# Patient Record
Sex: FEMALE | Race: WHITE | NOT HISPANIC OR LATINO | Employment: FULL TIME | URBAN - METROPOLITAN AREA
[De-identification: names, ages, dates, MRNs, and addresses within clinical notes are randomized per-mention and may not be internally consistent; named-entity substitution may affect disease eponyms.]

---

## 2023-12-06 ENCOUNTER — OFFICE VISIT (OUTPATIENT)
Dept: RHEUMATOLOGY | Facility: CLINIC | Age: 53
End: 2023-12-06
Payer: COMMERCIAL

## 2023-12-06 VITALS
SYSTOLIC BLOOD PRESSURE: 124 MMHG | WEIGHT: 253 LBS | BODY MASS INDEX: 40.84 KG/M2 | DIASTOLIC BLOOD PRESSURE: 78 MMHG | HEART RATE: 81 BPM

## 2023-12-06 DIAGNOSIS — M79.7 FIBROMYALGIA: Primary | ICD-10-CM

## 2023-12-06 PROCEDURE — 1036F TOBACCO NON-USER: CPT | Performed by: INTERNAL MEDICINE

## 2023-12-06 PROCEDURE — 99212 OFFICE O/P EST SF 10 MIN: CPT | Performed by: INTERNAL MEDICINE

## 2023-12-06 RX ORDER — PANTOPRAZOLE SODIUM 40 MG/1
40 TABLET, DELAYED RELEASE ORAL
COMMUNITY
Start: 2010-02-03

## 2023-12-06 RX ORDER — HALOBETASOL PROPIONATE 0.5 MG/G
CREAM TOPICAL 2 TIMES DAILY
COMMUNITY

## 2023-12-06 RX ORDER — DULOXETIN HYDROCHLORIDE 60 MG/1
60 CAPSULE, DELAYED RELEASE ORAL DAILY
COMMUNITY
Start: 2012-09-07

## 2023-12-06 RX ORDER — ATORVASTATIN CALCIUM 40 MG/1
40 TABLET, FILM COATED ORAL
COMMUNITY

## 2023-12-06 RX ORDER — LEVOTHYROXINE SODIUM 125 UG/1
125 TABLET ORAL DAILY
COMMUNITY
Start: 2022-12-26

## 2023-12-06 RX ORDER — TOPIRAMATE 50 MG/1
TABLET, FILM COATED ORAL
COMMUNITY
Start: 2020-10-07 | End: 2024-05-15 | Stop reason: SDUPTHER

## 2023-12-06 RX ORDER — RISPERIDONE 1 MG/1
1 TABLET ORAL DAILY
COMMUNITY
Start: 2020-03-24

## 2023-12-06 RX ORDER — LOSARTAN POTASSIUM 50 MG/1
50 TABLET ORAL DAILY
COMMUNITY

## 2023-12-06 RX ORDER — LIDOCAINE 50 MG/G
OINTMENT TOPICAL
COMMUNITY

## 2023-12-06 RX ORDER — SEMAGLUTIDE 1.34 MG/ML
1 INJECTION, SOLUTION SUBCUTANEOUS ONCE
COMMUNITY
Start: 2022-12-26

## 2023-12-06 RX ORDER — RISPERIDONE 0.25 MG/1
0.5 TABLET ORAL
COMMUNITY

## 2023-12-06 RX ORDER — ASPIRIN 325 MG
325 TABLET ORAL DAILY
COMMUNITY
Start: 2015-02-25

## 2023-12-06 RX ORDER — CYCLOBENZAPRINE HCL 10 MG
10 TABLET ORAL 2 TIMES DAILY PRN
COMMUNITY
End: 2024-05-15 | Stop reason: SDUPTHER

## 2023-12-06 RX ORDER — HUMAN INSULIN 100 [IU]/ML
INJECTION, SUSPENSION SUBCUTANEOUS EVERY 12 HOURS
COMMUNITY

## 2023-12-06 RX ORDER — METOPROLOL TARTRATE 25 MG/1
25 TABLET, FILM COATED ORAL DAILY
COMMUNITY
Start: 2012-10-17

## 2023-12-06 NOTE — PROGRESS NOTES
She complains of pain and numbness in the lower legs and feet.  Numbness in the toes improves with taking alpha lipoic acid.  She was unable to tolerate gabapentin in the past due to excessive sedation.  She continues to take topiramate 25 mg at bedtime.  She is planning to join an exercise program.  She is seeing a chiropractor for sciatica symptoms.    The lungs, heart, abdomen, and extremities are benign.  Pedal pulses are 1+ in the posterior tibial arteries and 2+ in the dorsalis pedis arteries bilaterally.  There are no joint effusions.  There is mild tenderness in the proximal radial aspect of the right forearm.    She has history of undifferentiated connective tissue disorder, insulin-dependent diabetes mellitus, fibromyalgia, nephrolithiasis, peripheral neuropathy in the lower extremities.    She is to continue cyclobenzaprine 10 mg at bedtime as needed for muscle spasms and topiramate 50 mg tablet half tablet every evening for fibromyalgia and peripheral neuropathy.  She is to have laboratory for CRP prior to next office visit.  She is to return at the next available office appointment.

## 2024-05-15 ENCOUNTER — OFFICE VISIT (OUTPATIENT)
Dept: RHEUMATOLOGY | Facility: CLINIC | Age: 54
End: 2024-05-15
Payer: COMMERCIAL

## 2024-05-15 VITALS
HEART RATE: 71 BPM | WEIGHT: 256 LBS | DIASTOLIC BLOOD PRESSURE: 74 MMHG | BODY MASS INDEX: 42.6 KG/M2 | SYSTOLIC BLOOD PRESSURE: 141 MMHG

## 2024-05-15 DIAGNOSIS — M47.816 OSTEOARTHRITIS OF LUMBAR SPINE, UNSPECIFIED SPINAL OSTEOARTHRITIS COMPLICATION STATUS: ICD-10-CM

## 2024-05-15 DIAGNOSIS — M79.7 FIBROMYALGIA: Primary | ICD-10-CM

## 2024-05-15 PROCEDURE — 1036F TOBACCO NON-USER: CPT | Performed by: INTERNAL MEDICINE

## 2024-05-15 PROCEDURE — 99213 OFFICE O/P EST LOW 20 MIN: CPT | Performed by: INTERNAL MEDICINE

## 2024-05-15 RX ORDER — TOPIRAMATE 50 MG/1
25 TABLET, FILM COATED ORAL DAILY
Qty: 30 TABLET | Refills: 6 | Status: SHIPPED | OUTPATIENT
Start: 2024-05-15 | End: 2025-05-15

## 2024-05-15 RX ORDER — CYCLOBENZAPRINE HCL 10 MG
10 TABLET ORAL NIGHTLY
Qty: 30 TABLET | Refills: 8 | Status: SHIPPED | OUTPATIENT
Start: 2024-05-15 | End: 2025-05-15

## 2024-05-15 NOTE — PROGRESS NOTES
She complains of generalized fatigue.  She also notes lower back pain and fatigue and numbness in her thighs.  She notes that she has difficulty walking any prolonged distance due to fatigue and heaviness in the thighs.  She has difficulty standing up from the floor.  She has had physical therapy and has been attending chiropractic therapy for the past 2 years with continued symptoms.  She notes urinary urgency but denies any bowel or bladder incontinence.  Her blood sugars have been under improved control.  She has problem with claustrophobia.    There is tenderness overlying the lumbar spine and sacroiliac joints bilaterally without induration.  There is good muscle strength in the hip flexors, knee extensors and knee flexors, the dorsiflexors and plantar flexors of both feet.    She has symptoms of neurogenic claudication with bilateral thigh pain and weakness and fatigue.  She has history of diabetes mellitus, fibromyalgia, sicca syndrome, nephrolithiasis, and peripheral neuropathy.    She is referred for x-ray of the lumbar spine as well as open MRI of the lumbar spine for evaluation for lumbar spinal canal stenosis.  She is to return at the next available office appointment.

## 2024-06-19 ENCOUNTER — APPOINTMENT (OUTPATIENT)
Dept: NEUROSURGERY | Facility: CLINIC | Age: 54
End: 2024-06-19
Payer: COMMERCIAL

## 2024-06-19 VITALS
DIASTOLIC BLOOD PRESSURE: 60 MMHG | SYSTOLIC BLOOD PRESSURE: 110 MMHG | HEIGHT: 66 IN | BODY MASS INDEX: 40.63 KG/M2 | HEART RATE: 70 BPM | WEIGHT: 252.8 LBS

## 2024-06-19 DIAGNOSIS — G89.29 CHRONIC BILATERAL LOW BACK PAIN WITHOUT SCIATICA: Primary | ICD-10-CM

## 2024-06-19 DIAGNOSIS — M54.50 CHRONIC BILATERAL LOW BACK PAIN WITHOUT SCIATICA: Primary | ICD-10-CM

## 2024-06-19 PROCEDURE — 99203 OFFICE O/P NEW LOW 30 MIN: CPT | Performed by: PHYSICIAN ASSISTANT

## 2024-06-19 PROCEDURE — 1036F TOBACCO NON-USER: CPT | Performed by: PHYSICIAN ASSISTANT

## 2024-06-19 RX ORDER — INSULIN ASPART 100 [IU]/ML
INJECTION, SOLUTION INTRAVENOUS; SUBCUTANEOUS
COMMUNITY

## 2024-06-19 RX ORDER — INSULIN GLARGINE 100 [IU]/ML
38 INJECTION, SOLUTION SUBCUTANEOUS
COMMUNITY

## 2024-06-19 RX ORDER — ACYCLOVIR 400 MG/1
400 TABLET ORAL 3 TIMES DAILY
COMMUNITY
Start: 2022-04-26

## 2024-06-19 RX ORDER — METFORMIN HYDROCHLORIDE 1000 MG/1
TABLET ORAL EVERY 12 HOURS
COMMUNITY
Start: 2022-12-26

## 2024-06-19 RX ORDER — BIOTIN 1 MG
TABLET ORAL EVERY 24 HOURS
COMMUNITY
Start: 2012-09-07

## 2024-06-19 RX ORDER — INSULIN GLARGINE 100 [IU]/ML
INJECTION, SOLUTION SUBCUTANEOUS
COMMUNITY

## 2024-06-19 RX ORDER — INSULIN GLARGINE 300 [IU]/ML
INJECTION, SOLUTION SUBCUTANEOUS
COMMUNITY
Start: 2021-02-16

## 2024-06-19 RX ORDER — CEPHRADINE 500 MG
CAPSULE ORAL EVERY 24 HOURS
COMMUNITY
Start: 2012-09-07

## 2024-06-19 RX ORDER — FLUCONAZOLE 150 MG/1
TABLET ORAL
COMMUNITY
Start: 2013-02-26

## 2024-06-19 RX ORDER — ALBUTEROL SULFATE 90 UG/1
AEROSOL, METERED RESPIRATORY (INHALATION) EVERY 6 HOURS
COMMUNITY

## 2024-06-19 ASSESSMENT — ENCOUNTER SYMPTOMS
DEPRESSION: 0
LOSS OF SENSATION IN FEET: 1
OCCASIONAL FEELINGS OF UNSTEADINESS: 0

## 2024-06-19 ASSESSMENT — PATIENT HEALTH QUESTIONNAIRE - PHQ9
1. LITTLE INTEREST OR PLEASURE IN DOING THINGS: NOT AT ALL
SUM OF ALL RESPONSES TO PHQ9 QUESTIONS 1 & 2: 0
2. FEELING DOWN, DEPRESSED OR HOPELESS: NOT AT ALL

## 2024-06-19 ASSESSMENT — PAIN SCALES - GENERAL: PAINLEVEL: 2

## 2024-06-19 NOTE — PROGRESS NOTES
[facility]  Department of Neurological Surgery  New Patient Visit    History of Present Illness:  Jorge Alberto Nguyen is a 54 y.o. year old female who presents to the spine clinic with chronic low back pain with radiating symptoms into her hips and buttocks as well as anteriorly through her thighs.  All symptoms seem to stop at her knees with nothing progressing further into her ankle or feet.  Ongoing for more than 10 years though worsening recently.  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  It is improved with sleeping though worsened also significantly with walking.  Currently rates her pain at 2/10 though will increase to 5/10.  Traction while a chiropractor did provide some limited benefit while engaged though upon standing up and walking away the pain returned.  She has an MRI identified L5-S1 foraminal stenosis left-sided as well as chronic T12 compression fracture.    Prior Spine Surgeries: no    Physical Therapy: yes with chiropractor  Diabetic: yes   Osteoporosis: no  Patient's BMI is Body mass index is 40.8 kg/m².    14/14 systems reviewed and negative other than what is listed in the history of present illness    There is no problem list on file for this patient.    History reviewed. No pertinent past medical history.  Past Surgical History:   Procedure Laterality Date    OTHER SURGICAL HISTORY  06/24/2015    Hand Repair     Social History     Tobacco Use    Smoking status: Never    Smokeless tobacco: Never   Substance Use Topics    Alcohol use: Yes     Comment: Occ     family history is not on file.    Current Outpatient Medications:     acyclovir (Zovirax) 400 mg tablet, Take 1 tablet (400 mg) by mouth 3 times a day., Disp: , Rfl:     alpha lipoic acid 200 mg capsule, once every 24 hours., Disp: , Rfl:     biotin 1 mg tablet, once every 24 hours.,  Disp: , Rfl:     fluconazole (Diflucan) 150 mg tablet, Take by mouth., Disp: , Rfl:     insulin glargine (Toujeo Max U-300 SoloStar) 300 unit/mL (3 mL) injection, as directed Subcutaneous 66units at bedtime for 90 days, Disp: , Rfl:     metFORMIN (Glucophage) 1,000 mg tablet, every 12 hours., Disp: , Rfl:     albuterol 90 mcg/actuation inhaler, every 6 hours., Disp: , Rfl:     aspirin 325 mg tablet, Take 1 tablet (325 mg) by mouth once daily., Disp: , Rfl:     atorvastatin (Lipitor) 40 mg tablet, Take 1 tablet (40 mg) by mouth once daily., Disp: , Rfl:     cyclobenzaprine (Flexeril) 10 mg tablet, Take 1 tablet (10 mg) by mouth once daily at bedtime., Disp: 30 tablet, Rfl: 8    DULoxetine (Cymbalta) 60 mg DR capsule, Take 1 capsule (60 mg) by mouth once daily., Disp: , Rfl:     halobetasol (UltraVATE) 0.05 % cream, Apply topically 2 times a day., Disp: , Rfl:     insulin aspart (NovoLOG U-100 Insulin aspart) 100 unit/mL injection, PER PUMP 115 UNITS SUBCUTANEOUS 90 DAYS for 90, Disp: , Rfl:     insulin glargine (Basaglar KwikPen U-100 Insulin) 100 unit/mL (3 mL) pen, Inject under the skin., Disp: , Rfl:     insulin glargine (Lantus) 100 unit/mL injection, Inject 38 Units under the skin., Disp: , Rfl:     insulin NPH, Isophane, (NovoLIN N NPH U-100 Insulin) 100 unit/mL injection, Inject under the skin every 12 hours., Disp: , Rfl:     levothyroxine (Synthroid, Levoxyl) 125 mcg tablet, Take 1 tablet (125 mcg) by mouth once daily., Disp: , Rfl:     lidocaine (Xylocaine) 5 % ointment, Apply topically., Disp: , Rfl:     losartan (Cozaar) 50 mg tablet, Take 1 tablet (50 mg) by mouth once daily., Disp: , Rfl:     metoprolol tartrate (Lopressor) 25 mg tablet, Take 1 tablet (25 mg) by mouth once daily., Disp: , Rfl:     NovoLOG PenFill U-100 Insulin 100 unit/mL pen cartridge, upto 120 units QAC Subcutaneous upto 120 units QAC for 90 days, Disp: , Rfl:     Ozempic 1 mg/dose (4 mg/3 mL) pen injector, Inject 1 mg under the skin  1 time., Disp: , Rfl:     pantoprazole (ProtoNix) 40 mg EC tablet, Take 1 tablet (40 mg) by mouth once daily in the morning. Take before meals., Disp: , Rfl:     risperiDONE (RisperDAL) 0.25 mg tablet, Take 2 tablets (0.5 mg) by mouth once daily in the morning. Take before meals., Disp: , Rfl:     risperiDONE (RisperDAL) 1 mg tablet, Take 1 tablet (1 mg) by mouth once daily., Disp: , Rfl:     topiramate (Topamax) 50 mg tablet, Take 0.5 tablets (25 mg) by mouth once daily., Disp: 30 tablet, Rfl: 6  Allergies   Allergen Reactions    Hydromorphone Hives, Itching and Unknown    Rosuvastatin Myalgia    Penicillins Hives and Rash       Physical Examination    General: Well developed, awake/alert/oriented x3, no distress, alert and cooperative  Skin: Warm and dry, no lesions, no rashes  ENMT: Mucous membranes moist, no apparent injury, no lesions seen  Head/Neck: Neck Supple, no apparent injury  Respiratory/Thorax: Normal breath sounds with good chest expansion, thorax symmetric  Cardiovascular: No pitting edema, no JVD    Motor Strength: 5/5 Throughout all extremities    Muscle Bulk: Normal and symmetric in all extremities    Posture:   -- Cervical: Normal  -- Thoracic: Normal  -- Lumbar : Normal  Paraspinal muscle spasm/tenderness absent.   Midline tenderness present ~L5-S1    Sensation: intact to light touch       Assessment and Plan:  Jorge Alberto Nguyen is a 54 y.o. year old female who presents to the spine clinic with chronic low back pain with radiating symptoms into her hips and buttocks as well as anteriorly through her thighs.  All symptoms seem to stop at her knees with nothing progressing further into her ankle or feet.  Ongoing for more than 10 years though worsening recently.  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  It is improved with sleeping though worsened also significantly  with walking.  Currently rates her pain at 2/10 though will increase to 5/10.  Traction while a chiropractor did provide some limited benefit while engaged though upon standing up and walking away the pain returned.  She has an MRI identified L5-S1 foraminal stenosis left-sided as well as chronic T12 compression fracture.  MRI images are not available for review in office today.  Have discussed possible causes of central stenosis or peripheral nerve issues.  Would like to review pictures prior to making neck steps going forward.  These may include EMG, upright x-rays with flexion-extension, or thoracic MRI.       The above clinical summary has been dictated with voice recognition software. It has not been proofread for grammatical errors, typographical mistakes, or other semantic inconsistencies.    Thank you for visiting our office today. It was our pleasure to take part in your healthcare.     Do not hesitate to call with any questions regarding your plan of care after leaving at [phone] M-F 8am-4pm.     To clinicians, thank you very much for this kind referral. It is a privilege to partner with you in the care of your patients. My office would be delighted to assist you with any further consultations or with questions regarding the plan of care outlined. Do not hesitate to call the office or contact me directly.       Sincerely,      ROSALINA Ortez, PA-C  Associate Physician Assistant, Neurosurgery  Clinical   [facility] [employer]    [place] [id number]    Phone: [phone]  Fax: [phone]

## 2024-06-25 DIAGNOSIS — M48.04 SPINAL STENOSIS OF THORACIC REGION: Primary | ICD-10-CM

## 2024-06-25 NOTE — PROGRESS NOTES
Patient was able to obtain lumbar MRI images and drop these off to office for review.  Lumbar region significant for left-sided foraminal stenosis L5-S1 with moderate to severe foraminal stenosis.  Does have disc bulge and facet arthropathy at T11-12 with degree of central canal stenosis as well as left-sided T10-11 disc bulge with central and foraminal stenosis not fully identified and axial imaging.  Likely with additional disc issues superiorly and MRI thoracic spine will be ordered for further evaluation.

## 2024-07-14 ENCOUNTER — OFFICE VISIT (OUTPATIENT)
Dept: URGENT CARE | Facility: CLINIC | Age: 54
End: 2024-07-14
Payer: COMMERCIAL

## 2024-07-14 VITALS
RESPIRATION RATE: 24 BRPM | BODY MASS INDEX: 40.67 KG/M2 | OXYGEN SATURATION: 98 % | DIASTOLIC BLOOD PRESSURE: 68 MMHG | SYSTOLIC BLOOD PRESSURE: 99 MMHG | TEMPERATURE: 97.9 F | HEART RATE: 79 BPM | WEIGHT: 252 LBS

## 2024-07-14 DIAGNOSIS — N30.01 ACUTE CYSTITIS WITH HEMATURIA: ICD-10-CM

## 2024-07-14 DIAGNOSIS — R35.0 URINARY FREQUENCY: Primary | ICD-10-CM

## 2024-07-14 LAB
POC APPEARANCE, URINE: ABNORMAL
POC BILIRUBIN, URINE: ABNORMAL
POC BLOOD, URINE: ABNORMAL
POC COLOR, URINE: YELLOW
POC GLUCOSE, URINE: NEGATIVE MG/DL
POC KETONES, URINE: ABNORMAL MG/DL
POC LEUKOCYTES, URINE: ABNORMAL
POC NITRITE,URINE: NEGATIVE
POC PH, URINE: 5.5 PH
POC PROTEIN, URINE: ABNORMAL MG/DL
POC SPECIFIC GRAVITY, URINE: >=1.03
POC UROBILINOGEN, URINE: 1 EU/DL

## 2024-07-14 PROCEDURE — 87086 URINE CULTURE/COLONY COUNT: CPT

## 2024-07-14 PROCEDURE — 1036F TOBACCO NON-USER: CPT | Performed by: PHYSICIAN ASSISTANT

## 2024-07-14 PROCEDURE — 81002 URINALYSIS NONAUTO W/O SCOPE: CPT | Performed by: PHYSICIAN ASSISTANT

## 2024-07-14 PROCEDURE — 99204 OFFICE O/P NEW MOD 45 MIN: CPT | Performed by: PHYSICIAN ASSISTANT

## 2024-07-14 RX ORDER — SULFAMETHOXAZOLE AND TRIMETHOPRIM 800; 160 MG/1; MG/1
1 TABLET ORAL 2 TIMES DAILY
Qty: 14 TABLET | Refills: 0 | Status: SHIPPED | OUTPATIENT
Start: 2024-07-14 | End: 2024-07-21

## 2024-07-14 ASSESSMENT — ENCOUNTER SYMPTOMS
HEMATURIA: 1
NAUSEA: 0
PSYCHIATRIC NEGATIVE: 1
BACK PAIN: 1
ALLERGIC/IMMUNOLOGIC NEGATIVE: 1
HEMATOLOGIC/LYMPHATIC NEGATIVE: 1
CARDIOVASCULAR NEGATIVE: 1
NEUROLOGICAL NEGATIVE: 1
RESPIRATORY NEGATIVE: 1
VOMITING: 0
EYES NEGATIVE: 1
DYSURIA: 1
FEVER: 0
ENDOCRINE NEGATIVE: 1
FREQUENCY: 1
ABDOMINAL PAIN: 0

## 2024-07-14 NOTE — PROGRESS NOTES
Subjective   Patient ID: Jorge Alberto Nguyen is a 54 y.o. female.      History provided by:  Patient   used: No    UTI  Associated symptoms: no abdominal pain, no fever, no nausea and no vomiting    This is a 54 yr old here for  sxs. Back pain, dysuria, urine frequency/urgency, and episodes of urine incontinence x 2 days. No abdominal pain, n/v, abnormal vaginal discharge or genital rash or lesions.    Review of Systems   Constitutional:  Negative for fever.   HENT: Negative.     Eyes: Negative.    Respiratory: Negative.     Cardiovascular: Negative.    Gastrointestinal:  Negative for abdominal pain, nausea and vomiting.   Endocrine: Negative.    Genitourinary:  Positive for dysuria, frequency, hematuria and urgency. Negative for genital sores and vaginal discharge.   Musculoskeletal:  Positive for back pain.   Skin: Negative.    Allergic/Immunologic: Negative.    Neurological: Negative.    Hematological: Negative.    Psychiatric/Behavioral: Negative.     All other systems reviewed and are negative.  BP 99/68   Pulse 79   Temp 36.6 °C (97.9 °F)   Resp 24   Wt 114 kg (252 lb)   SpO2 98%   BMI 40.67 kg/m²     Objective   Physical Exam  Vitals and nursing note reviewed.   Constitutional:       Appearance: Normal appearance.   HENT:      Head: Normocephalic and atraumatic.   Cardiovascular:      Rate and Rhythm: Normal rate and regular rhythm.   Pulmonary:      Effort: Pulmonary effort is normal.      Breath sounds: Normal breath sounds.   Abdominal:      Palpations: Abdomen is soft.      Tenderness: There is abdominal tenderness (SP). There is no right CVA tenderness or left CVA tenderness.   Skin:     General: Skin is warm and dry.   Neurological:      General: No focal deficit present.      Mental Status: She is alert and oriented to person, place, and time.   Psychiatric:         Mood and Affect: Mood normal.         Behavior: Behavior normal.     UA dip-large blood, positive trace  leuks    Assessment:  UTI    Plan:  Bactrim DS bid x 1 week  Urine cx sent and pending  Increase clear fluids-water, sugar free cranberry juice  Pcp follow up this week if not improving or worsening  ER visit anytime 24/7 for acute worsening or changing condition

## 2024-07-16 LAB — BACTERIA UR CULT: NORMAL

## 2024-07-23 LAB — HEMOGLOBIN A1C/HEMOGLOBIN TOTAL IN BLOOD EXTERNAL: 7.2 %

## 2024-08-05 ENCOUNTER — TELEPHONE (OUTPATIENT)
Dept: NEUROSURGERY | Facility: CLINIC | Age: 54
End: 2024-08-05
Payer: COMMERCIAL

## 2024-08-05 NOTE — TELEPHONE ENCOUNTER
----- Message from Damian Bar sent at 7/17/2024  3:37 PM EDT -----  Regarding: MRI results  Only moderate stenosis of lateral recesses. Usually not needing surgery. If her symptosm are continuing awful, may schedule with surgeon or refer to pain mgmt for injections?  ----- Message -----  From: Kelli Mckay MA  Sent: 7/16/2024  12:11 PM EDT  To: Damian Bar PA-C    [facility] -MRI Thoracic Spine Results Imaging being pushed.

## 2024-09-10 ENCOUNTER — TELEPHONE (OUTPATIENT)
Dept: PRIMARY CARE | Facility: CLINIC | Age: 54
End: 2024-09-10
Payer: COMMERCIAL

## 2024-09-10 PROBLEM — J34.2 NASAL SEPTAL DEVIATION: Status: ACTIVE | Noted: 2024-09-10

## 2024-09-10 PROBLEM — Z00.00 ROUTINE GENERAL MEDICAL EXAMINATION AT A HEALTH CARE FACILITY: Status: ACTIVE | Noted: 2024-09-10

## 2024-09-10 PROBLEM — M48.04 SPINAL STENOSIS, THORACIC: Status: ACTIVE | Noted: 2024-09-10

## 2024-09-10 PROBLEM — M35.01 KERATOCONJUNCTIVITIS SICCA (MULTI): Status: ACTIVE | Noted: 2024-09-10

## 2024-09-10 PROBLEM — J45.20 MILD INTERMITTENT ASTHMA WITHOUT COMPLICATION (HHS-HCC): Status: ACTIVE | Noted: 2024-09-10

## 2024-09-10 PROBLEM — K29.50 CHRONIC GASTRITIS: Status: ACTIVE | Noted: 2024-09-10

## 2024-09-10 PROBLEM — K76.0 FATTY LIVER: Status: ACTIVE | Noted: 2024-09-10

## 2024-09-10 PROBLEM — I25.10 CAD (CORONARY ARTERY DISEASE): Status: ACTIVE | Noted: 2024-09-10

## 2024-09-10 PROBLEM — Z87.442 H/O RENAL CALCULI: Status: ACTIVE | Noted: 2024-09-10

## 2024-09-10 PROBLEM — R76.8 ANA POSITIVE: Status: ACTIVE | Noted: 2024-09-10

## 2024-09-10 PROBLEM — F41.9 ANXIETY: Status: ACTIVE | Noted: 2024-09-10

## 2024-09-10 PROBLEM — M79.7 FIBROMYALGIA: Status: ACTIVE | Noted: 2024-09-10

## 2024-09-10 PROBLEM — J43.9: Status: ACTIVE | Noted: 2024-09-10

## 2024-09-10 PROBLEM — F33.0 MILD EPISODE OF RECURRENT MAJOR DEPRESSIVE DISORDER (CMS-HCC): Status: ACTIVE | Noted: 2024-09-10

## 2024-09-10 PROBLEM — I10 PRIMARY HYPERTENSION: Status: ACTIVE | Noted: 2024-09-10

## 2024-09-10 PROBLEM — M47.816 OSTEOARTHRITIS OF LUMBAR SPINE: Status: ACTIVE | Noted: 2024-09-10

## 2024-09-10 PROBLEM — A60.00 GENITAL HERPES: Status: ACTIVE | Noted: 2024-09-10

## 2024-09-10 PROBLEM — E13.9: Status: ACTIVE | Noted: 2024-09-10

## 2024-09-10 PROBLEM — G43.109 MIGRAINE WITH AURA: Status: ACTIVE | Noted: 2024-09-10

## 2024-09-10 PROBLEM — M51.369 DDD (DEGENERATIVE DISC DISEASE), LUMBAR: Status: ACTIVE | Noted: 2024-09-10

## 2024-09-10 PROBLEM — E11.43 PERIPHERAL AUTONOMIC NEUROPATHY DUE TO DIABETES MELLITUS (MULTI): Status: ACTIVE | Noted: 2024-09-10

## 2024-09-10 PROBLEM — H16.229 KERATOCONJUNCTIVITIS SICCA: Status: ACTIVE | Noted: 2024-09-10

## 2024-09-10 PROBLEM — L40.9 PSORIASIS: Status: ACTIVE | Noted: 2024-09-10

## 2024-09-10 PROBLEM — K58.9 IBS (IRRITABLE BOWEL SYNDROME): Status: ACTIVE | Noted: 2024-09-10

## 2024-09-10 PROBLEM — M35.1 MIXED CONNECTIVE TISSUE DISEASE (MULTI): Status: ACTIVE | Noted: 2024-09-10

## 2024-09-10 PROBLEM — M54.50 CHRONIC BILATERAL LOW BACK PAIN WITHOUT SCIATICA: Status: ACTIVE | Noted: 2024-09-10

## 2024-09-10 PROBLEM — E55.9 VITAMIN D DEFICIENCY: Status: ACTIVE | Noted: 2024-09-10

## 2024-09-10 PROBLEM — E78.00 PURE HYPERCHOLESTEROLEMIA: Status: ACTIVE | Noted: 2024-09-10

## 2024-09-10 PROBLEM — G89.29 CHRONIC BILATERAL LOW BACK PAIN WITHOUT SCIATICA: Status: ACTIVE | Noted: 2024-09-10

## 2024-09-10 PROBLEM — E03.9 ACQUIRED HYPOTHYROIDISM: Status: ACTIVE | Noted: 2024-09-10

## 2024-09-10 PROBLEM — I25.2 H/O ACUTE MYOCARDIAL INFARCTION OF ANTERIOR WALL: Status: ACTIVE | Noted: 2024-09-10

## 2024-09-10 PROBLEM — J43.9: Status: RESOLVED | Noted: 2024-09-10 | Resolved: 2024-09-10

## 2024-09-10 PROBLEM — Z86.711 HISTORY OF PULMONARY EMBOLISM: Status: ACTIVE | Noted: 2024-09-10

## 2024-09-10 PROBLEM — D50.9 IRON DEFICIENCY ANEMIA: Status: ACTIVE | Noted: 2024-09-10

## 2024-09-10 PROBLEM — K50.90 CROHN DISEASE (MULTI): Status: ACTIVE | Noted: 2024-09-10

## 2024-09-10 PROBLEM — M51.36 DDD (DEGENERATIVE DISC DISEASE), LUMBAR: Status: ACTIVE | Noted: 2024-09-10

## 2024-09-10 PROBLEM — Q87.19 SJOGREN-LARSSON SYNDROME (HHS-HCC): Status: ACTIVE | Noted: 2024-09-10

## 2024-09-10 PROBLEM — G47.33 OSA (OBSTRUCTIVE SLEEP APNEA): Status: ACTIVE | Noted: 2024-09-10

## 2024-09-10 RX ORDER — BISMUTH SUBSALICYLATE 262 MG
1 TABLET,CHEWABLE ORAL DAILY
COMMUNITY

## 2024-09-10 RX ORDER — FLUTICASONE PROPIONATE 50 MCG
2 SPRAY, SUSPENSION (ML) NASAL DAILY
COMMUNITY

## 2024-09-10 RX ORDER — ALPHA LIPOIC ACID 600 MG
1 TABLET ORAL DAILY
COMMUNITY

## 2024-09-10 RX ORDER — HYDROXYZINE HYDROCHLORIDE 50 MG/1
50 TABLET, FILM COATED ORAL EVERY 6 HOURS PRN
COMMUNITY

## 2024-09-10 ASSESSMENT — ENCOUNTER SYMPTOMS
UNEXPECTED WEIGHT CHANGE: 0
ABDOMINAL PAIN: 0
VOMITING: 0
BLOOD IN STOOL: 0
FATIGUE: 0
NECK PAIN: 0
APPETITE CHANGE: 0
HEMATURIA: 0
FREQUENCY: 0
BRUISES/BLEEDS EASILY: 0
ADENOPATHY: 0
COUGH: 0
DYSURIA: 0
EYE PAIN: 0
POLYPHAGIA: 0
POLYDIPSIA: 0
DIZZINESS: 0
PALPITATIONS: 0
CHILLS: 0
EYE DISCHARGE: 0
FEVER: 0
BACK PAIN: 0
EYE REDNESS: 0
WOUND: 0
SORE THROAT: 0
HEADACHES: 0
HALLUCINATIONS: 0
SHORTNESS OF BREATH: 0
CONFUSION: 0
TROUBLE SWALLOWING: 0

## 2024-09-10 NOTE — PROGRESS NOTES
Subjective   Patient ID: Jorge Alberto Ngueyn is a 54 y.o. female who presents for Follow-up.    new pt-peg-last pcp,  therapist, psychiatrist, pain, urology, neuro others? Pcp- dr valero, [facility]  Heredia, papi, karuna, milks,tag, marks  Name of eye dr/facility- [facility] in [place]     Any forms to fill out? No   last physical-1/11/24  is pt fasting? No   Bps at home- 120/80  Does pt want a flu shot? Yes   last colonoscopy- does not know off hand but knows she is not due   Last ct for lung ca screening? Never had   last pap 7/18/23; due 7/2028  last mammo-11/21/23 [facility]; is pt going to get next order from ob gyn or should I write it? Obgyn   Last albuterol inh use- a couple weeks   Does pt have cpap? Yes but does not use since couldn't tyrell masks-not used for yrs  If yes, using nightly? N/a  Is risperdal 0.25, 0.5 or 1mg? Is is 1 a day?  .25 mg in morning and 1 mg in the evening   Does pt use novolin N? No Just novolog   Is pt on novolog for fast acting insulin? Yes   Does pt have an insulin pump? Yes   Last eye dr- last year   If in the last 12mon, epg  Last dentist- dec. 2023   When did endo do last A1c? 1 month ago   Did endo diagnose her with type 1, type 1.5 or type 2 diabetes? Type 1.5   On statin  B12 -due  Pt wants to go over lab results; does she have them with her? On her phone  Who ordered them? When were they done? Dr valero, January.   Any other questions or concerns that you want to discuss today? No     Family history form    Monofilament out-shoes OFF-done    Sees endo for diabetes      Patient Care Team     Relationship Specialty Notifications Start End   Gustabo Heredia DO Referring Physician Endocrinology Admissions 9/10/24     Address:  [facility] [address] [place] [address]       HPI  Last labs-7/2024 A1c 7.3 at dr heredia, tsh nl, cmp- high potassium-stopped losartan-per dr heredia  1/2024 microalb nl, cbc nl, cmp sugar 181, sodium low, lipid nl, vit d nl  2022 tsh low  "but t4/t3 nl  Due for labs- cmp, lipid, tsh, b12    Hard time walking and leg pain but resolved when stopped lisinopril    Upper arm pain; not sure if how sleeps  Declines PT    Lost 20lbs  Drinking more water    Sees endo for diabetes    No results found for: \"CHOL\"  No results found for: \"TRIG\"  No results found for: \"HDL\"  No results found for: \"LDL\"  No results found for: \"TSH\"  No results found for: \"A1C\"  No components found for: \"POCA1C\"  No results found for: \"ALBUR\"  No components found for: \"POCALBUR\"        Immunization History   Administered Date(s) Administered    Pfizer COVID-19 vaccine, Fall 2023, 12 years and older, (30mcg/0.3mL) 01/11/2024    Pfizer COVID-19 vaccine, bivalent, age 12 years and older (30 mcg/0.3 mL) 09/15/2022    Pfizer Gray Cap SARS-CoV-2 04/11/2022    Pfizer Purple Cap SARS-CoV-2 03/18/2021, 04/08/2021, 11/01/2021    Pneumococcal conjugate vaccine, 20-valent (PREVNAR 20) 12/06/2022    Pneumococcal polysaccharide vaccine, 23-valent, age 2 years and older (PNEUMOVAX 23) 10/17/2014    Tdap vaccine, age 7 year and older (BOOSTRIX, ADACEL) 01/30/2018    Zoster vaccine, recombinant, adult (SHINGRIX) 12/02/2020, 02/01/2021           Review of Systems   Constitutional:  Negative for appetite change, chills, fatigue, fever and unexpected weight change.   HENT:  Negative for congestion, ear pain, sore throat and trouble swallowing.    Eyes:  Negative for pain, discharge and redness.   Respiratory:  Negative for cough and shortness of breath.    Cardiovascular:  Negative for chest pain and palpitations.   Gastrointestinal:  Negative for abdominal pain, blood in stool and vomiting.   Endocrine: Negative for polydipsia, polyphagia and polyuria.   Genitourinary:  Negative for dysuria, frequency, hematuria and urgency.   Musculoskeletal:  Negative for back pain and neck pain.   Skin:  Negative for rash and wound.   Allergic/Immunologic: Negative for immunocompromised state.   Neurological:  " Negative for dizziness, syncope and headaches.   Hematological:  Negative for adenopathy. Does not bruise/bleed easily.   Psychiatric/Behavioral:  Negative for confusion and hallucinations.        Objective   Visit Vitals  /82   Pulse 78   Temp 36.5 °C (97.7 °F)      BP Readings from Last 3 Encounters:   09/11/24 124/82   07/14/24 99/68   06/19/24 110/60     Wt Readings from Last 3 Encounters:   09/11/24 110 kg (243 lb)   07/14/24 114 kg (252 lb)   06/19/24 115 kg (252 lb 12.8 oz)           Physical Exam  Constitutional:       General: She is not in acute distress.     Appearance: Normal appearance.   Cardiovascular:      Rate and Rhythm: Normal rate and regular rhythm.      Heart sounds: Normal heart sounds. No murmur heard.  Pulmonary:      Effort: Pulmonary effort is normal.      Breath sounds: No wheezing, rhonchi or rales.   Feet:      Right foot:      Protective Sensation: 5 sites tested.  5 sites sensed.      Skin integrity: Skin integrity normal.      Left foot:      Protective Sensation: 5 sites tested.  5 sites sensed.      Skin integrity: Skin integrity normal.   Neurological:      Mental Status: She is alert.         Assessment/Plan   Diagnoses and all orders for this visit:  Pure hypercholesterolemia  -     Comprehensive Metabolic Panel; Future  -     Lipid Panel; Future  Medication management  -     Vitamin B12; Future  Acquired hypothyroidism  -     TSH with reflex to Free T4 if abnormal; Future  Diabetes mellitus type 1.5, managed as type 1 (Multi)  -     POCT glycosylated hemoglobin (Hb A1C) manually resulted  -     Comprehensive Metabolic Panel; Future  Fatty liver  Iron deficiency anemia, unspecified iron deficiency anemia type  Primary hypertension  -     Comprehensive Metabolic Panel; Future  Former cigarette smoker  -     CT lung screening low dose; Future  Other orders  -     Flu vaccine, trivalent, preservative free, age 6 months and greater (Fluraix/Fluzone/Flulaval)  -     Follow Up  In Primary Care - Health Maintenance; Future

## 2024-09-10 NOTE — TELEPHONE ENCOUNTER
Called pt about appt Wednesday- pt did not answer. Left voicemail requesting that she bring in her blood work she would like to go over and requested she arrive 15 mins before her appt time.

## 2024-09-11 ENCOUNTER — HOSPITAL ENCOUNTER (OUTPATIENT)
Dept: RADIOLOGY | Facility: EXTERNAL LOCATION | Age: 54
Discharge: HOME | End: 2024-09-11

## 2024-09-11 ENCOUNTER — APPOINTMENT (OUTPATIENT)
Dept: PRIMARY CARE | Facility: CLINIC | Age: 54
End: 2024-09-11
Payer: COMMERCIAL

## 2024-09-11 VITALS
HEIGHT: 66 IN | WEIGHT: 243 LBS | DIASTOLIC BLOOD PRESSURE: 82 MMHG | SYSTOLIC BLOOD PRESSURE: 124 MMHG | OXYGEN SATURATION: 95 % | HEART RATE: 78 BPM | BODY MASS INDEX: 39.05 KG/M2 | TEMPERATURE: 97.7 F

## 2024-09-11 DIAGNOSIS — Z79.899 MEDICATION MANAGEMENT: ICD-10-CM

## 2024-09-11 DIAGNOSIS — K76.0 FATTY LIVER: ICD-10-CM

## 2024-09-11 DIAGNOSIS — Z87.891 FORMER CIGARETTE SMOKER: ICD-10-CM

## 2024-09-11 DIAGNOSIS — E13.9 DIABETES MELLITUS TYPE 1.5, MANAGED AS TYPE 1 (MULTI): ICD-10-CM

## 2024-09-11 DIAGNOSIS — I10 PRIMARY HYPERTENSION: ICD-10-CM

## 2024-09-11 DIAGNOSIS — D50.9 IRON DEFICIENCY ANEMIA, UNSPECIFIED IRON DEFICIENCY ANEMIA TYPE: ICD-10-CM

## 2024-09-11 DIAGNOSIS — E03.9 ACQUIRED HYPOTHYROIDISM: ICD-10-CM

## 2024-09-11 DIAGNOSIS — E78.00 PURE HYPERCHOLESTEROLEMIA: Primary | ICD-10-CM

## 2024-09-11 PROCEDURE — 3074F SYST BP LT 130 MM HG: CPT | Performed by: NURSE PRACTITIONER

## 2024-09-11 PROCEDURE — 90471 IMMUNIZATION ADMIN: CPT | Performed by: NURSE PRACTITIONER

## 2024-09-11 PROCEDURE — 1036F TOBACCO NON-USER: CPT | Performed by: NURSE PRACTITIONER

## 2024-09-11 PROCEDURE — 3008F BODY MASS INDEX DOCD: CPT | Performed by: NURSE PRACTITIONER

## 2024-09-11 PROCEDURE — 3079F DIAST BP 80-89 MM HG: CPT | Performed by: NURSE PRACTITIONER

## 2024-09-11 PROCEDURE — 90656 IIV3 VACC NO PRSV 0.5 ML IM: CPT | Performed by: NURSE PRACTITIONER

## 2024-09-11 PROCEDURE — 99214 OFFICE O/P EST MOD 30 MIN: CPT | Performed by: NURSE PRACTITIONER

## 2024-09-11 PROCEDURE — 4010F ACE/ARB THERAPY RXD/TAKEN: CPT | Performed by: NURSE PRACTITIONER

## 2024-09-11 ASSESSMENT — PATIENT HEALTH QUESTIONNAIRE - PHQ9
2. FEELING DOWN, DEPRESSED OR HOPELESS: NOT AT ALL
SUM OF ALL RESPONSES TO PHQ9 QUESTIONS 1 AND 2: 0
1. LITTLE INTEREST OR PLEASURE IN DOING THINGS: NOT AT ALL

## 2024-09-11 NOTE — PATIENT INSTRUCTIONS
Get mammogram order from ob gyn    Flu shot today    Set up ct lung cancer screening    Triceps stretches    Labs when fasting.    Return in jan for wellness appt      I will communicate with you via [employer] regarding messages and results. If you need help with this, you can call the support line at [phone].    IT WAS A PLEASURE TO SEE YOU TODAY. THANK YOU FOR CHOOSING US FOR YOUR HEALTHCARE NEEDS.

## 2024-09-25 LAB
NON-UH HIE A/G RATIO: 1.2
NON-UH HIE ALB: 3.6 G/DL (ref 3.4–5)
NON-UH HIE ALK PHOS: 116 UNIT/L (ref 45–117)
NON-UH HIE BILIRUBIN, TOTAL: 0.4 MG/DL (ref 0.3–1.2)
NON-UH HIE BUN/CREAT RATIO: 17.5
NON-UH HIE BUN: 14 MG/DL (ref 9–23)
NON-UH HIE CALCIUM: 9.7 MG/DL (ref 8.7–10.4)
NON-UH HIE CALCULATED LDL CHOLESTEROL: 86 MG/DL (ref 60–130)
NON-UH HIE CALCULATED OSMOLALITY: 281 MOSM/KG (ref 275–295)
NON-UH HIE CHLORIDE: 106 MMOL/L (ref 98–107)
NON-UH HIE CHOLESTEROL: 154 MG/DL (ref 100–200)
NON-UH HIE CO2, VENOUS: 26 MMOL/L (ref 20–31)
NON-UH HIE CREATININE: 0.8 MG/DL (ref 0.5–0.8)
NON-UH HIE FREE T4: 1.27 NG/DL (ref 0.89–1.76)
NON-UH HIE GFR AA: >60
NON-UH HIE GLOBULIN: 3 G/DL
NON-UH HIE GLOMERULAR FILTRATION RATE: >60 ML/MIN/1.73M?
NON-UH HIE GLUCOSE: 126 MG/DL (ref 74–106)
NON-UH HIE GOT: 20 UNIT/L (ref 15–37)
NON-UH HIE GPT: 24 UNIT/L (ref 10–49)
NON-UH HIE HDL CHOLESTEROL: 52 MG/DL (ref 40–60)
NON-UH HIE K: 4.6 MMOL/L (ref 3.5–5.1)
NON-UH HIE NA: 140 MMOL/L (ref 135–145)
NON-UH HIE TOTAL CHOL/HDL CHOL RATIO: 3
NON-UH HIE TOTAL PROTEIN: 6.6 G/DL (ref 5.7–8.2)
NON-UH HIE TRIGLYCERIDES: 81 MG/DL (ref 30–150)
NON-UH HIE TSH: 0.13 UIU/ML (ref 0.55–4.78)
NON-UH HIE VITAMIN B12: 244 PG/ML (ref 211–911)

## 2024-09-26 ENCOUNTER — TELEPHONE (OUTPATIENT)
Dept: PRIMARY CARE | Facility: CLINIC | Age: 54
End: 2024-09-26
Payer: COMMERCIAL

## 2024-09-26 NOTE — TELEPHONE ENCOUNTER
----- Message from Audelia Beverly sent at 9/25/2024  6:53 PM EDT -----  Pls fax all results to dr branden banks (endo ); note on cover sheet that tsh is low and t4 is normal.

## 2024-10-23 ENCOUNTER — APPOINTMENT (OUTPATIENT)
Dept: RHEUMATOLOGY | Facility: CLINIC | Age: 54
End: 2024-10-23
Payer: COMMERCIAL

## 2024-10-23 VITALS
HEIGHT: 66 IN | SYSTOLIC BLOOD PRESSURE: 121 MMHG | WEIGHT: 241 LBS | HEART RATE: 71 BPM | DIASTOLIC BLOOD PRESSURE: 75 MMHG | BODY MASS INDEX: 38.73 KG/M2

## 2024-10-23 DIAGNOSIS — M47.816 OSTEOARTHRITIS OF LUMBAR SPINE, UNSPECIFIED SPINAL OSTEOARTHRITIS COMPLICATION STATUS: Primary | ICD-10-CM

## 2024-10-23 PROCEDURE — 3078F DIAST BP <80 MM HG: CPT | Performed by: INTERNAL MEDICINE

## 2024-10-23 PROCEDURE — 3051F HG A1C>EQUAL 7.0%<8.0%: CPT | Performed by: INTERNAL MEDICINE

## 2024-10-23 PROCEDURE — 99213 OFFICE O/P EST LOW 20 MIN: CPT | Performed by: INTERNAL MEDICINE

## 2024-10-23 PROCEDURE — 4010F ACE/ARB THERAPY RXD/TAKEN: CPT | Performed by: INTERNAL MEDICINE

## 2024-10-23 PROCEDURE — 1036F TOBACCO NON-USER: CPT | Performed by: INTERNAL MEDICINE

## 2024-10-23 PROCEDURE — 3074F SYST BP LT 130 MM HG: CPT | Performed by: INTERNAL MEDICINE

## 2024-10-23 PROCEDURE — 3008F BODY MASS INDEX DOCD: CPT | Performed by: INTERNAL MEDICINE

## 2024-10-23 ASSESSMENT — PAIN SCALES - GENERAL: PAINLEVEL_OUTOF10: 0-NO PAIN

## 2024-10-23 NOTE — PROGRESS NOTES
She has been feeling well without musculoskeletal complaints.  She denies having any back pain.  She noted that musculoskeletal symptoms improved after losartan was discontinued due to hyperkalemia.  She continues to take Ozempic and metformin for management of diabetes mellitus.    There is good passive range of motion of the upper and lower extremity joints without joint effusions.  The lungs, heart, abdomen, and extremities are benign.     Laboratory (9/25/2024) sodium 140, potassium 4.6, chloride 106, bicarbonate 26, BUN 14, creatinine 0.8, glucose 126, calcium 9.7, SGPT 24, SGOT 20, open phosphatase 116, TSH 0.13, free T4 1.27, vitamin B12 244.    MRI thoracic spine (7/12/2024) tiny central disc protrusion at T6/T7.  Broad-based left paracentral through subarticular disc protrusion at T10/T11 with moderately severe effacement of the left lateral recess.  There is broad-based central to right foraminal disc protrusion at T11/T12 with moderate effacement of the right lateral recess.  There is a diffuse disc bulge at T12/L1.  There is a right-sided extrarenal pelvis and probable small parapelvic renal cysts.  MRI lumbar spine (6/7/2024) mild chronic wedge compression deformity at T12, mixed type I and type II endplate changes between T11-L1 and at L5/S1.  There is a diffuse disc bulge with small broad-based right paracentral disc protrusion at T11/T12.  There is diffuse disc bulge at T12/L1 and at L4/L5 with mild central canal stenosis at L4/L5.  There is diffuse disc osteophyte complex at L5/S1 with moderately severe left neuroforaminal stenosis.    Impression: She has a history of diabetes mellitus, fibromyalgia, sicca symptoms, nephrolithiasis, peripheral neuropathy, lumbar and thoracic spondylosis with mild central spinal canal stenosis at L4/L5.    She is to continue cyclobenzaprine 10 mg at bedtime as needed for muscle spasms and topiramate 25 mg daily.  She is to return at the next available office  appointment.

## 2025-01-20 ASSESSMENT — ENCOUNTER SYMPTOMS
POLYDIPSIA: 0
CHILLS: 0
DYSURIA: 0
TROUBLE SWALLOWING: 0
DIZZINESS: 0
POLYPHAGIA: 0
CONFUSION: 0
BACK PAIN: 0
UNEXPECTED WEIGHT CHANGE: 0
COUGH: 0
EYE DISCHARGE: 0
NECK PAIN: 0
FREQUENCY: 0
EYE REDNESS: 0
VOMITING: 0
HALLUCINATIONS: 0
SORE THROAT: 0
EYE PAIN: 0
HEADACHES: 0
FATIGUE: 0
ABDOMINAL PAIN: 0
PALPITATIONS: 0
APPETITE CHANGE: 0
BLOOD IN STOOL: 0
WOUND: 0
HEMATURIA: 0
FEVER: 0
ADENOPATHY: 0
BRUISES/BLEEDS EASILY: 0
SHORTNESS OF BREATH: 0

## 2025-01-20 NOTE — PATIENT INSTRUCTIONS
Set up colonoscopy with dr turpin:  Team Member Role and Specialty Contact Info Address Start End Comments   Anthony Turpin MD Referring Physician (Gastroenterology) Phone: [phone]  Fax: [phone] [address]  [address]  [facility] [address] 9/10/2024 - -     Set up ct lung    See eye dr Landrum dentist    Labs when fasting        Handouts given to pt:  physical handout        Labs- No appt needed:    You can use the lab in our building when fasting. The hrs are: Mon-Fri 7a-330p.  No Saturday hrs. Bring the paper order.   OR   [facility] Mon-Fri 7a-12p. No Saturday hrs. Bring the paper order.  OR   [facility] Mon-Fri 630a-530p or Sat 6:30a-12p. Bring the paper order  OR   [facility] Mon-FrI 7a-5p, Sat 8a-12p  [facility] Mon-Fri 730a-4p, Sat  8a-12p  [facility] [id number] Mon-Thurs 630a-6p , Fri 630a-4p, Sat 8a-12p  [facility] [address] Mon-Fri 7a-630p and Sat. 7a-3p    Fasting is no food, drink, gum or mints other than water for 12 hrs.   Results will be back in 2-3 business days for most labs. It is always recommended for any orders (labs, xrays, ultrasounds,MRI, ct scan, procedures etc) to check with your insurance provider for expected costs or expenses to you.       You will get your results via phone from my medical assistant if you do not have MyChart.  OR  You will get your results via [employer]    If a result is urgent, I will call to speak to you.    Vaccines:  Up to date      General recommendations:  Exercise-cardio 4-5d/wk 30min each day  Diet-Breakfast-toast (my favorite Ewa Desouza Delighful Multigrain or Conner's Killer Bread Good Seed thin-sliced)/bagel/English muffin-whole wheat flour as a 1st ingredient or cereal/oatmeal/granola bar-fiber 4g or more or protein like eggs or peanut butter; optional veggies  Lunch-protein, 1/2c carb or 2 slices bread, veg 1c  Dinner-protein, fist sized carb, veg 1c  Fruit 2 a day  Dairy 2 a  day-milk, soy milk, almond milk, cheese, yogurt, cottage cheese  Snacks-Protein-hard boiled egg, nuts (walnuts/almonds/pecans/pistachios 1/4c), hummus, beef/deer jerky or meat sticks; vegetable, fruit, dairy-milk(1%, skim, almond, soy)/cheese (not a lot of cheddar)/yogurt (Greek is best-my favorite Dannon Fruit on the Bottom Greek)/cottage cheese 2%; triscuits/ popcorn/wheat thins have a lot of fiber; follow serving size on bag/box/container  increase water  Limit alcohol to 1 drink per day for women and 2 drinks per day for men (1 drink=12oz beer or 5oz wine or 1 1/2oz liquor)  Calcium: 500mg 1 twice a day if age 50 and younger and 600mg 1 twice a day if over age 50 (calcium citrate can be taken without food)  Vitamin D: 800-5000 IU/day  Limit salt to <2300mg a day if age 50 and under and <1500mg a day if over age 50/have high bp or diabetes or kidney disease  Recommend folate for childbearing age women 0.4mg per day (can be found in a multivitamin)  Recommend 18mg/dL of iron a day if age 50 and under and 8mg/dL a day if over age 50; take on an empty stomach at bedtime  Use sunscreen   Wear seatbelt  Recommend safe sex practices: using condoms everytime you have sex, discuss with a new partner about their past partners/history of STDs/drug use, avoid drinking alcohol or using drugs as this increases the chance that you will participate in high-risk sex, for oral sex help protect your mouth by having your partner use a condom (male or female), women should not douche after sex, be aware of your partner's body and your body-look for signs of a sore, blister, rash, or discharge, and have regular exams and periodic tests for STDs.  No distracted driving  No driving when under influence of substances  Wear a seatbelt  Eye dr every 1-2yrs  Dentist every 6-12 mon  Tetanus shot every 10yrs  Recommend flu vaccine in the fall  Appt in 3mon for follow up on diet exercise and 1 year for physical      I will communicate with  you via S3Bubble regarding messages and results. If you need help with this, you can call the support line at [phone].    IT WAS A PLEASURE TO SEE YOU TODAY. THANK YOU FOR CHOOSING US FOR YOUR HEALTHCARE NEEDS.

## 2025-01-20 NOTE — PROGRESS NOTES
"Subjective   Patient ID: Jorge Alberto Nguyen is a 54 y.o. female who presents for Annual Exam.      Is pt fasting? No  Does pt see any providers other than care team below: No  alternative paths for psychiatrist, Giovanna, karuna turpin milks, tag, stokes, [facility] in [place]     Any forms to fill out? No  *I have last colonoscopy as 2018; pt was due in 2023 for another one; has pt had a colonoscopy since 2018? Yes, she believes so.  Did pt do ct lung? No  Bps at home-averaging 130/80s, 108/75  Last use of albuterol inhaler-a month ago  *When was last A1c at endo? December 2024 7.3  *When was last microalbumin urine test at endo? no  If not since jan last yr, does she want us to do it and sent the result to her endo?yes  When was last labs for endo? December 2024  Eye dr -Dec 2023  Dentist-Dec 2023  Sugars am-averaging 150  Sugars 2hrs pp- Has a CGM  B12-9/2024  On statin  Foot check-9/2024  Any other questions or concerns that pt wants to discuss today?  No    Poc microalb-nl    does not use cpap since couldn't tyrell masks-not used for yrs   Sees endo for type 1.5 diabetes    Patient Care Team     Relationship Specialty Notifications Start End   Gustabo Heredia DO Referring Physician Endocrinology Admissions 9/10/24     Address:  [facility] [address] [address]       HPI  Last labs-  9/2024 Sugar high at 126. Goal <100. Decr carbs and sugars. You have known diabetes  kidney function, liver function and electrolytes in the CMP (comprehensive metabolic panel) were normal.  Tsh low, t4 normal. I'll fax these results to dr heredia to see if he wants to adjust your med.  All cholesterol labs normal.  B12 normal.  7/2024 A1c 7.2  1/2024 microalb  Due for labs- fasting sugar, cbc      No results found for: \"CHOL\"  No results found for: \"TRIG\"  No results found for: \"HDL\"  No results found for: \"LDL\"  No results found for: \"TSH\"  No results found for: \"A1C\"  No components found for: \"POCA1C\"  No results found " "for: \"ALBUR\"  No components found for: \"POCALBUR\"      Other concerns:2 uti in 1yr      ER/urgicare visits in the last year- 11/2024 lf foot pain, couple uti  Hospitalizations in the last year-       last Pap- 7/18/23; due 7/2028    FH ovarian, cervical, uterine ca-none    last mammo- (40-75/90)-12/17/24 [facility]    FH br ca-mat aunt    last colonoscopy/cologuard/FIT (45-75) 3/27/18; was due 3/2023; due  FH colon ca-none      lung ca screening (age 50-77 and 1 ppd x 20yrs and currently smoke or quit within 15yrs) 28 py-has order      Exercise- chasing a 5yo and walking a puppy  Diet-1 meal  Body mass index is 39.58 kg/m².    last eye dr appt- 2023  No vision issues    last dental appt- 2023    BMs-regular to constipation from ozempic  Sleep-able to fall asleep and stay asleep; no snoring or apnea  no cp, swelling, sob, abd pain, n/v/d/c, blood in stool or black stools  STI testing including hiv (age 15-65) and hep c screening (18-79)-declines      Immunization History   Administered Date(s) Administered    COVID-19, mRNA, LNP-S, PF, 30 mcg/0.3 mL dose 03/18/2021, 04/08/2021, 11/01/2021    Flu vaccine, trivalent, preservative free, age 6 months and greater (Fluarix/Fluzone/Flulaval) 09/11/2024    Pfizer COVID-19 vaccine, 12 years and older, (30mcg/0.3mL) (Comirnaty) 01/11/2024    Pfizer COVID-19 vaccine, bivalent, age 12 years and older (30 mcg/0.3 mL) 09/15/2022    Pfizer Gray Cap SARS-CoV-2 04/11/2022    Pneumococcal conjugate vaccine, 20-valent (PREVNAR 20) 12/06/2022    Pneumococcal polysaccharide vaccine, 23-valent, age 2 years and older (PNEUMOVAX 23) 10/17/2014    Tdap vaccine, age 7 year and older (BOOSTRIX, ADACEL) 01/30/2018    Zoster vaccine, recombinant, adult (SHINGRIX) 12/02/2020, 02/01/2021       Utd on vaccines    Galleri testing (50yrs+)-discussed    fractures in lifetime-back, toes, lf arm  Anyone with osteoporosis in the family-none    FH heart attack, heart surgery-none  FH stroke-none    The ASCVD " Risk score (Austin ANTHONY, et al., 2019) failed to calculate for the following reasons:    Risk score cannot be calculated because patient has a medical history suggesting prior/existing ASCVD  Sees cardio dr    Review of Systems   Constitutional:  Negative for appetite change, chills, fatigue, fever and unexpected weight change.   HENT:  Negative for congestion, ear pain, sore throat and trouble swallowing.    Eyes:  Negative for pain, discharge and redness.   Respiratory:  Negative for cough and shortness of breath.    Cardiovascular:  Negative for chest pain and palpitations.   Gastrointestinal:  Negative for abdominal pain, blood in stool and vomiting.   Endocrine: Negative for polydipsia, polyphagia and polyuria.   Genitourinary:  Negative for dysuria, frequency, hematuria and urgency.   Musculoskeletal:  Negative for back pain and neck pain.   Skin:  Negative for rash and wound.   Allergic/Immunologic: Negative for immunocompromised state.   Neurological:  Negative for dizziness, syncope and headaches.   Hematological:  Negative for adenopathy. Does not bruise/bleed easily.   Psychiatric/Behavioral:  Negative for confusion and hallucinations.        Objective   Visit Vitals  /90   Pulse 81   Temp 35.6 °C (96 °F)      BP Readings from Last 3 Encounters:   01/21/25 144/90   10/23/24 121/75   09/11/24 124/82     Wt Readings from Last 3 Encounters:   01/21/25 111 kg (245 lb 3.2 oz)   10/23/24 109 kg (241 lb)   09/11/24 110 kg (243 lb)           Physical Exam  Constitutional:       General: She is not in acute distress.     Appearance: Normal appearance. She is not ill-appearing.   HENT:      Head: Normocephalic.      Right Ear: Tympanic membrane, ear canal and external ear normal.      Left Ear: Tympanic membrane, ear canal and external ear normal.      Nose: Nose normal.      Mouth/Throat:      Mouth: Mucous membranes are moist.      Pharynx: Oropharynx is clear.   Eyes:      Extraocular Movements: Extraocular  movements intact.      Conjunctiva/sclera: Conjunctivae normal.      Pupils: Pupils are equal, round, and reactive to light.   Cardiovascular:      Rate and Rhythm: Normal rate and regular rhythm.      Heart sounds: Normal heart sounds. No murmur heard.  Pulmonary:      Effort: Pulmonary effort is normal. No respiratory distress.      Breath sounds: Normal breath sounds. No wheezing, rhonchi or rales.   Abdominal:      General: Bowel sounds are normal.      Palpations: Abdomen is soft. There is no mass.      Tenderness: There is no abdominal tenderness.   Musculoskeletal:         General: No swelling or tenderness. Normal range of motion.      Cervical back: Normal range of motion and neck supple.      Right lower leg: No edema.      Left lower leg: No edema.   Skin:     General: Skin is warm.      Findings: No rash.   Neurological:      General: No focal deficit present.      Mental Status: She is alert and oriented to person, place, and time.      Cranial Nerves: No cranial nerve deficit.      Motor: No weakness.   Psychiatric:         Mood and Affect: Mood normal.         Behavior: Behavior normal.       Assessment/Plan   Diagnoses and all orders for this visit:  Routine general medical examination at a health care facility  Primary hypertension  -     CBC and Auto Differential; Future  Pure hypercholesterolemia  Diabetes mellitus type 1.5, managed as type 1 (Multi)  -     Glucose, fasting; Future  -     POCT Albumin random urine manually resulted  BMI 39.0-39.9,adult  Class 2 severe obesity due to excess calories with serious comorbidity and body mass index (BMI) of 39.0 to 39.9 in adult  Other orders  -     Follow Up In Primary Care - Health Maintenance  -     Follow Up In Primary Care - Established; Future      See patient instructions for full plan

## 2025-01-21 ENCOUNTER — APPOINTMENT (OUTPATIENT)
Dept: PRIMARY CARE | Facility: CLINIC | Age: 55
End: 2025-01-21
Payer: COMMERCIAL

## 2025-01-21 VITALS
DIASTOLIC BLOOD PRESSURE: 78 MMHG | BODY MASS INDEX: 39.41 KG/M2 | TEMPERATURE: 96 F | HEART RATE: 81 BPM | HEIGHT: 66 IN | WEIGHT: 245.2 LBS | SYSTOLIC BLOOD PRESSURE: 122 MMHG

## 2025-01-21 DIAGNOSIS — E66.01 CLASS 2 SEVERE OBESITY DUE TO EXCESS CALORIES WITH SERIOUS COMORBIDITY AND BODY MASS INDEX (BMI) OF 39.0 TO 39.9 IN ADULT: ICD-10-CM

## 2025-01-21 DIAGNOSIS — E66.812 CLASS 2 SEVERE OBESITY DUE TO EXCESS CALORIES WITH SERIOUS COMORBIDITY AND BODY MASS INDEX (BMI) OF 39.0 TO 39.9 IN ADULT: ICD-10-CM

## 2025-01-21 DIAGNOSIS — Z00.00 ROUTINE GENERAL MEDICAL EXAMINATION AT A HEALTH CARE FACILITY: Primary | ICD-10-CM

## 2025-01-21 DIAGNOSIS — E13.9 DIABETES MELLITUS TYPE 1.5, MANAGED AS TYPE 1 (MULTI): ICD-10-CM

## 2025-01-21 DIAGNOSIS — E78.00 PURE HYPERCHOLESTEROLEMIA: ICD-10-CM

## 2025-01-21 DIAGNOSIS — I10 PRIMARY HYPERTENSION: ICD-10-CM

## 2025-01-21 LAB
POC ALBUMIN /CREATININE RATIO MANUALLY ENTERED: <30 UG/MG CREAT
POC URINE ALBUMIN: 30 MG/L
POC URINE CREATININE: 300 MG/DL

## 2025-01-21 PROCEDURE — 3074F SYST BP LT 130 MM HG: CPT | Performed by: NURSE PRACTITIONER

## 2025-01-21 PROCEDURE — 82044 UR ALBUMIN SEMIQUANTITATIVE: CPT | Performed by: NURSE PRACTITIONER

## 2025-01-21 PROCEDURE — 4010F ACE/ARB THERAPY RXD/TAKEN: CPT | Performed by: NURSE PRACTITIONER

## 2025-01-21 PROCEDURE — 3008F BODY MASS INDEX DOCD: CPT | Performed by: NURSE PRACTITIONER

## 2025-01-21 PROCEDURE — 99396 PREV VISIT EST AGE 40-64: CPT | Performed by: NURSE PRACTITIONER

## 2025-01-21 PROCEDURE — 1036F TOBACCO NON-USER: CPT | Performed by: NURSE PRACTITIONER

## 2025-01-21 PROCEDURE — 3078F DIAST BP <80 MM HG: CPT | Performed by: NURSE PRACTITIONER

## 2025-01-21 ASSESSMENT — PATIENT HEALTH QUESTIONNAIRE - PHQ9
SUM OF ALL RESPONSES TO PHQ9 QUESTIONS 1 AND 2: 0
2. FEELING DOWN, DEPRESSED OR HOPELESS: NOT AT ALL
1. LITTLE INTEREST OR PLEASURE IN DOING THINGS: NOT AT ALL

## 2025-01-22 LAB
NON-UH HIE BASO COUNT: 0.06 X1000 (ref 0–0.2)
NON-UH HIE BASOS %: 1.3 %
NON-UH HIE DIFF?: NO
NON-UH HIE EOS COUNT: 0.52 X1000 (ref 0–0.5)
NON-UH HIE EOSIN %: 10.5 %
NON-UH HIE GLUCOSE: 129 MG/DL (ref 74–106)
NON-UH HIE HCT: 38.8 % (ref 36–46)
NON-UH HIE HGB: 12.8 G/DL (ref 12–16)
NON-UH HIE INSTR WBC: 4.9
NON-UH HIE LYMPH %: 18.4 %
NON-UH HIE LYMPH COUNT: 0.91 X1000 (ref 1.2–4.8)
NON-UH HIE MCH: 29.3 PG (ref 27–34)
NON-UH HIE MCHC: 32.9 G/DL (ref 32–37)
NON-UH HIE MCV: 89.2 FL (ref 80–100)
NON-UH HIE MONO %: 9.4 %
NON-UH HIE MONO COUNT: 0.47 X1000 (ref 0.1–1)
NON-UH HIE MPV: 9.3 FL (ref 7.4–10.4)
NON-UH HIE NEUTROPHIL %: 60.3 %
NON-UH HIE NEUTROPHIL COUNT (ANC): 2.98 X1000 (ref 1.4–8.8)
NON-UH HIE NUCLEATED RBC: 0 /100WBC
NON-UH HIE PLATELET: 290 X10 (ref 150–450)
NON-UH HIE RBC: 4.35 X10 (ref 4.2–5.4)
NON-UH HIE RDW: 14 % (ref 11.5–14.5)
NON-UH HIE WBC: 4.9 X10 (ref 4.5–11)

## 2025-01-25 LAB — ALBUMIN/CREATININE (UG/MG) IN URINE EXTERNAL: 30 UG/MG CREAT

## 2025-03-08 DIAGNOSIS — M79.7 FIBROMYALGIA: ICD-10-CM

## 2025-03-08 DIAGNOSIS — M47.816 OSTEOARTHRITIS OF LUMBAR SPINE, UNSPECIFIED SPINAL OSTEOARTHRITIS COMPLICATION STATUS: ICD-10-CM

## 2025-03-10 DIAGNOSIS — J45.20 MILD INTERMITTENT ASTHMA WITHOUT COMPLICATION (HHS-HCC): Primary | ICD-10-CM

## 2025-03-10 RX ORDER — CYCLOBENZAPRINE HCL 10 MG
10 TABLET ORAL NIGHTLY
Qty: 30 TABLET | Refills: 0 | Status: SHIPPED | OUTPATIENT
Start: 2025-03-10

## 2025-03-10 RX ORDER — ALBUTEROL SULFATE 90 UG/1
2 INHALANT RESPIRATORY (INHALATION) EVERY 6 HOURS
Qty: 36 G | Refills: 0 | Status: SHIPPED | OUTPATIENT
Start: 2025-03-10

## 2025-03-31 DIAGNOSIS — F41.9 ANXIETY: Primary | ICD-10-CM

## 2025-03-31 DIAGNOSIS — F33.0 MILD EPISODE OF RECURRENT MAJOR DEPRESSIVE DISORDER (CMS-HCC): ICD-10-CM

## 2025-03-31 RX ORDER — DULOXETIN HYDROCHLORIDE 60 MG/1
60 CAPSULE, DELAYED RELEASE ORAL 2 TIMES DAILY
Qty: 90 CAPSULE | Refills: 2 | Status: SHIPPED | OUTPATIENT
Start: 2025-03-31

## 2025-04-07 DIAGNOSIS — M79.7 FIBROMYALGIA: ICD-10-CM

## 2025-04-07 DIAGNOSIS — M47.816 OSTEOARTHRITIS OF LUMBAR SPINE, UNSPECIFIED SPINAL OSTEOARTHRITIS COMPLICATION STATUS: ICD-10-CM

## 2025-04-07 RX ORDER — CYCLOBENZAPRINE HCL 10 MG
10 TABLET ORAL NIGHTLY
Qty: 30 TABLET | Refills: 0 | Status: SHIPPED | OUTPATIENT
Start: 2025-04-07

## 2025-04-16 LAB — HEMOGLOBIN A1C/HEMOGLOBIN TOTAL IN BLOOD EXTERNAL: 7.6 %

## 2025-04-21 ASSESSMENT — ENCOUNTER SYMPTOMS
CONSTITUTIONAL NEGATIVE: 1
SHORTNESS OF BREATH: 0
WHEEZING: 0

## 2025-04-21 NOTE — PROGRESS NOTES
"Subjective   Patient ID: Jorge Alberto Nguyen is a 54 y.o. female who presents for Follow-up.  Last physical:1/21/25  Ct lung 2/14/25    Is pt fasting? No   Bps at home- does not do   Last use of albuterol inhaler- 2 weeks ago   Eye dr - January 29, 2025  Yuniel if in the last 12mon.  Dentist- 2 weeks ago   Sugars average- 7.3 checked last Wednesday   B12-9/2024  On statin  Foot check-9/2024  When was cholesterol checked last? Last time you checked it   How many migraines has she gotten in the last month? 0   *Is pt taking vit d rx or otc? Otc   If yes how many milligrams and how many tabs per day? 2000iu 1 tab a day   Any other questions or concerns that pt wants to discuss today? No     Yuniel for [facility] for last yr of records and last yr of labs  Phq9=  4 , gad7=5    HPI  Last labs-4/2025 A1c 7.3-dr banks  1/2025 microalb nl,  Fasting sugar is 129. Goal <100. Decr carbs and sugars.  CBC (complete blood count) was normal which looks at infection and anemia markers.  12/2024 December 2024 7.3  9/2024 Sugar high at 126. Goal <100. Decr carbs and sugars. You have known diabetes  kidney function, liver function and electrolytes in the CMP (comprehensive metabolic panel) were normal.  Tsh low, t4 normal. I'll fax these results to dr banks to see if he wants to adjust your med.  All cholesterol labs normal.  B12 normal.  7/2024 A1c 7.2  1/2024 vit d nl  Due for labs- cmp, lipid, vit d    No results found for: \"CHOL\"  No results found for: \"TRIG\"  No results found for: \"HDL\"  No results found for: \"LDL\"  No results found for: \"TSH\"  No results found for: \"A1C\"  No components found for: \"POCA1C\"  No results found for: \"ALBUR\"  No components found for: \"POCALBUR\"    Exercise-got a puppy-walks; shiba inu  Diet-1-2 meals  Coffee 2c, water, diet a&w at night  V8 and cheese stick for lunch when remembers      Immunization History   Administered Date(s) Administered    COVID-19, mRNA, LNP-S, PF, 30 mcg/0.3 mL dose 03/18/2021, " 04/08/2021, 11/01/2021    Flu vaccine, trivalent, preservative free, age 6 months and greater (Fluarix/Fluzone/Flulaval) 09/11/2024    Pfizer COVID-19 vaccine, 12 years and older, (30mcg/0.3mL) (Comirnaty) 01/11/2024    Pfizer COVID-19 vaccine, bivalent, age 12 years and older (30 mcg/0.3 mL) 09/15/2022    Pfizer Gray Cap SARS-CoV-2 04/11/2022    Pneumococcal conjugate vaccine, 20-valent (PREVNAR 20) 12/06/2022    Pneumococcal polysaccharide vaccine, 23-valent, age 2 years and older (PNEUMOVAX 23) 10/17/2014    Tdap vaccine, age 7 year and older (BOOSTRIX, ADACEL) 01/30/2018    Zoster vaccine, recombinant, adult (SHINGRIX) 12/02/2020, 02/01/2021        Patient Care Team     Relationship Specialty Notifications Start End   Gustabo Heredia DO Referring Physician Endocrinology Admissions 9/10/24     Address:  [address] [place], [place] [address]jorge luis [address] [facility] [address] [facility] [address]        Review of Systems   Constitutional: Negative.    Respiratory:  Negative for shortness of breath and wheezing.    Cardiovascular:  Negative for chest pain.       Objective   Visit Vitals  /83   Pulse 78   Temp 36.1 °C (96.9 °F)      BP Readings from Last 3 Encounters:   04/22/25 137/83   01/21/25 122/78   10/23/24 121/75     Wt Readings from Last 3 Encounters:   04/22/25 111 kg (244 lb)   01/21/25 111 kg (245 lb 3.2 oz)   10/23/24 109 kg (241 lb)       The ASCVD Risk score (Austin ANTHONY, et al., 2019) failed to calculate for the following reasons:    Risk score cannot be calculated because patient has a medical history suggesting prior/existing ASCVD     Physical Exam  Constitutional:       General: She is not in acute distress.     Appearance: Normal appearance.   Neurological:      Mental Status: She is alert.         Assessment/Plan   Diagnoses and all orders for this visit:  Pure hypercholesterolemia  -     Comprehensive Metabolic Panel; Future  -     Lipid Panel; Future  Primary hypertension  -     Comprehensive Metabolic Panel; Future  Vitamin D deficiency  -      Vitamin D 25-Hydroxy,Total (for eval of Vitamin D levels); Future  Diabetes mellitus type 1.5, managed as type 1 (Multi)  -     semaglutide 2 mg/dose (8 mg/3 mL) pen injector; Inject 2 mg under the skin 1 (one) time per week.  BMI 39.0-39.9,adult  Class 2 severe obesity due to excess calories with serious comorbidity and body mass index (BMI) of 39.0 to 39.9 in adult  Other orders  -     Follow Up In Primary Care - Established  -     Follow Up In Primary Care - Health Maintenance; Future      See patient instructions for full plan

## 2025-04-22 ENCOUNTER — APPOINTMENT (OUTPATIENT)
Dept: PRIMARY CARE | Facility: CLINIC | Age: 55
End: 2025-04-22
Payer: COMMERCIAL

## 2025-04-22 VITALS
DIASTOLIC BLOOD PRESSURE: 83 MMHG | SYSTOLIC BLOOD PRESSURE: 137 MMHG | WEIGHT: 244 LBS | HEART RATE: 78 BPM | HEIGHT: 66 IN | TEMPERATURE: 96.9 F | BODY MASS INDEX: 39.21 KG/M2 | OXYGEN SATURATION: 94 %

## 2025-04-22 DIAGNOSIS — E66.812 CLASS 2 SEVERE OBESITY DUE TO EXCESS CALORIES WITH SERIOUS COMORBIDITY AND BODY MASS INDEX (BMI) OF 39.0 TO 39.9 IN ADULT: ICD-10-CM

## 2025-04-22 DIAGNOSIS — E55.9 VITAMIN D DEFICIENCY: ICD-10-CM

## 2025-04-22 DIAGNOSIS — E66.01 CLASS 2 SEVERE OBESITY DUE TO EXCESS CALORIES WITH SERIOUS COMORBIDITY AND BODY MASS INDEX (BMI) OF 39.0 TO 39.9 IN ADULT: ICD-10-CM

## 2025-04-22 DIAGNOSIS — E13.9 DIABETES MELLITUS TYPE 1.5, MANAGED AS TYPE 1 (MULTI): ICD-10-CM

## 2025-04-22 DIAGNOSIS — E78.00 PURE HYPERCHOLESTEROLEMIA: Primary | ICD-10-CM

## 2025-04-22 DIAGNOSIS — I10 PRIMARY HYPERTENSION: ICD-10-CM

## 2025-04-22 PROCEDURE — 99214 OFFICE O/P EST MOD 30 MIN: CPT | Performed by: NURSE PRACTITIONER

## 2025-04-22 PROCEDURE — 3008F BODY MASS INDEX DOCD: CPT | Performed by: NURSE PRACTITIONER

## 2025-04-22 PROCEDURE — 3079F DIAST BP 80-89 MM HG: CPT | Performed by: NURSE PRACTITIONER

## 2025-04-22 PROCEDURE — 1036F TOBACCO NON-USER: CPT | Performed by: NURSE PRACTITIONER

## 2025-04-22 PROCEDURE — 3075F SYST BP GE 130 - 139MM HG: CPT | Performed by: NURSE PRACTITIONER

## 2025-04-22 RX ORDER — CHOLECALCIFEROL (VITAMIN D3) 50 MCG
50 TABLET ORAL DAILY
COMMUNITY

## 2025-04-22 ASSESSMENT — ANXIETY QUESTIONNAIRES
2. NOT BEING ABLE TO STOP OR CONTROL WORRYING: NOT AT ALL
5. BEING SO RESTLESS THAT IT IS HARD TO SIT STILL: NOT AT ALL
IF YOU CHECKED OFF ANY PROBLEMS ON THIS QUESTIONNAIRE, HOW DIFFICULT HAVE THESE PROBLEMS MADE IT FOR YOU TO DO YOUR WORK, TAKE CARE OF THINGS AT HOME, OR GET ALONG WITH OTHER PEOPLE: NOT DIFFICULT AT ALL
7. FEELING AFRAID AS IF SOMETHING AWFUL MIGHT HAPPEN: SEVERAL DAYS
GAD7 TOTAL SCORE: 5
4. TROUBLE RELAXING: NOT AT ALL
1. FEELING NERVOUS, ANXIOUS, OR ON EDGE: MORE THAN HALF THE DAYS
3. WORRYING TOO MUCH ABOUT DIFFERENT THINGS: NOT AT ALL
6. BECOMING EASILY ANNOYED OR IRRITABLE: MORE THAN HALF THE DAYS

## 2025-04-22 ASSESSMENT — PATIENT HEALTH QUESTIONNAIRE - PHQ9
SUM OF ALL RESPONSES TO PHQ QUESTIONS 1-9: 4
SUM OF ALL RESPONSES TO PHQ9 QUESTIONS 1 AND 2: 1
4. FEELING TIRED OR HAVING LITTLE ENERGY: NEARLY EVERY DAY
10. IF YOU CHECKED OFF ANY PROBLEMS, HOW DIFFICULT HAVE THESE PROBLEMS MADE IT FOR YOU TO DO YOUR WORK, TAKE CARE OF THINGS AT HOME, OR GET ALONG WITH OTHER PEOPLE: NOT DIFFICULT AT ALL
8. MOVING OR SPEAKING SO SLOWLY THAT OTHER PEOPLE COULD HAVE NOTICED. OR THE OPPOSITE, BEING SO FIGETY OR RESTLESS THAT YOU HAVE BEEN MOVING AROUND A LOT MORE THAN USUAL: NOT AT ALL
9. THOUGHTS THAT YOU WOULD BE BETTER OFF DEAD, OR OF HURTING YOURSELF: NOT AT ALL
6. FEELING BAD ABOUT YOURSELF - OR THAT YOU ARE A FAILURE OR HAVE LET YOURSELF OR YOUR FAMILY DOWN: NOT AT ALL
3. TROUBLE FALLING OR STAYING ASLEEP OR SLEEPING TOO MUCH: NOT AT ALL
5. POOR APPETITE OR OVEREATING: NOT AT ALL
7. TROUBLE CONCENTRATING ON THINGS, SUCH AS READING THE NEWSPAPER OR WATCHING TELEVISION: NOT AT ALL
2. FEELING DOWN, DEPRESSED OR HOPELESS: NOT AT ALL
1. LITTLE INTEREST OR PLEASURE IN DOING THINGS: SEVERAL DAYS

## 2025-04-22 NOTE — PATIENT INSTRUCTIONS
Lose it, my fitness pal to make sure you are getting at least 1000cal a day    Consider mounjaro if not having wt loss with the ozempic    2 veggies  1 fruit  2 proteins  Portion size carbs and sugars/snacks/desserts  Cheese or yogurt or cottage cheese  Increase water  Continue walking    Do labs when fasting    I will get records from dr banks and rodo briones    Return in jan for wellness appt      I will communicate with you via Cellay regarding messages and results. If you need help with this, you can call the support line at [phone].    IT WAS A PLEASURE TO SEE YOU TODAY. THANK YOU FOR CHOOSING US FOR YOUR HEALTHCARE NEEDS.

## 2025-05-07 ENCOUNTER — APPOINTMENT (OUTPATIENT)
Dept: RHEUMATOLOGY | Facility: CLINIC | Age: 55
End: 2025-05-07
Payer: COMMERCIAL

## 2025-05-07 VITALS
DIASTOLIC BLOOD PRESSURE: 76 MMHG | HEART RATE: 72 BPM | BODY MASS INDEX: 39.05 KG/M2 | HEIGHT: 66 IN | WEIGHT: 243 LBS | SYSTOLIC BLOOD PRESSURE: 121 MMHG

## 2025-05-07 DIAGNOSIS — M47.816 OSTEOARTHRITIS OF LUMBAR SPINE, UNSPECIFIED SPINAL OSTEOARTHRITIS COMPLICATION STATUS: ICD-10-CM

## 2025-05-07 DIAGNOSIS — M79.7 FIBROMYALGIA: ICD-10-CM

## 2025-05-07 PROCEDURE — 3078F DIAST BP <80 MM HG: CPT | Performed by: INTERNAL MEDICINE

## 2025-05-07 PROCEDURE — 3074F SYST BP LT 130 MM HG: CPT | Performed by: INTERNAL MEDICINE

## 2025-05-07 PROCEDURE — 99213 OFFICE O/P EST LOW 20 MIN: CPT | Performed by: INTERNAL MEDICINE

## 2025-05-07 PROCEDURE — 3008F BODY MASS INDEX DOCD: CPT | Performed by: INTERNAL MEDICINE

## 2025-05-07 RX ORDER — TOPIRAMATE 50 MG/1
25 TABLET, FILM COATED ORAL DAILY
Qty: 30 TABLET | Refills: 4 | Status: SHIPPED | OUTPATIENT
Start: 2025-05-07 | End: 2026-05-07

## 2025-05-07 RX ORDER — CYCLOBENZAPRINE HCL 10 MG
10 TABLET ORAL NIGHTLY
Qty: 30 TABLET | Refills: 0 | Status: SHIPPED | OUTPATIENT
Start: 2025-05-07

## 2025-05-07 ASSESSMENT — PAIN SCALES - GENERAL: PAINLEVEL_OUTOF10: 0-NO PAIN

## 2025-05-08 NOTE — PROGRESS NOTES
She presents for follow-up evaluation without any new musculoskeletal complaints.  She has some discomfort involving the lower back.  She notes numbness in the lower extremities after prolonged standing at work.  She has some discomfort in her thumbs.  She has not noted any new rashes.  Her blood glucose has been under improved control with adjustments in monitor settings.    There are no joint effusions.  There is slight puffiness in the proximal aspect of some of the digits of both hands.  There is mild squaring of the first CMC joint bilaterally.  The knees are not swollen.  There is mild tenderness overlying the lower lumbar spine.  The straight leg raise test is normal bilaterally in the seated position.    X-ray of the left foot (11/23/2024) small plantar calcaneal enthesophyte, mild degenerative arthrosis of the first MTP joint and interphalangeal joints.    Chest CT scan (2/14/2025) Stable 2 mm subpleural nodule in the lateral aspect of the right upper lobe,  1 mm subpleural nodule in the posterior right lung apex,  5 mm subpleural nodule in the posterior aspect of the left lower lobe.  No mediastinal lymphadenopathy or pericardial effusion.  There is atherosclerotic aortic and coronary artery calcifications.    She has insulin-dependent type 2 diabetes mellitus, history of nephrolithiasis, hypothyroidism, sicca symptoms, fibromyalgia, lumbar spondylosis with mild L4/L5 spinal canal stenosis, peripheral neuropathy.    She is to do stretching exercises as tolerated.  She is to continue cyclobenzaprine 10 mg at bedtime and topiramate 25 mg daily.  She is to return at the next available office appointment.

## 2025-05-15 LAB
NON-UH HIE A/G RATIO: 1.2
NON-UH HIE ALB: 3.8 G/DL (ref 3.4–5)
NON-UH HIE ALK PHOS: 103 UNIT/L (ref 45–117)
NON-UH HIE BILIRUBIN, TOTAL: 0.4 MG/DL (ref 0.3–1.2)
NON-UH HIE BUN/CREAT RATIO: 16.7
NON-UH HIE BUN: 15 MG/DL (ref 9–23)
NON-UH HIE CALCIUM: 10.2 MG/DL (ref 8.7–10.4)
NON-UH HIE CALCULATED LDL CHOLESTEROL: 67 MG/DL (ref 60–130)
NON-UH HIE CALCULATED OSMOLALITY: 278 MOSM/KG (ref 275–295)
NON-UH HIE CHLORIDE: 102 MMOL/L (ref 98–107)
NON-UH HIE CHOLESTEROL: 143 MG/DL (ref 100–200)
NON-UH HIE CO2, VENOUS: 27 MMOL/L (ref 20–31)
NON-UH HIE CREATININE: 0.9 MG/DL (ref 0.5–0.8)
NON-UH HIE GFR AA: >60
NON-UH HIE GLOBULIN: 3.1 G/DL
NON-UH HIE GLOMERULAR FILTRATION RATE: >60 ML/MIN/1.73M?
NON-UH HIE GLUCOSE: 125 MG/DL (ref 74–106)
NON-UH HIE GOT: 14 UNIT/L (ref 15–37)
NON-UH HIE GPT: 16 UNIT/L (ref 10–49)
NON-UH HIE HDL CHOLESTEROL: 55 MG/DL (ref 40–60)
NON-UH HIE K: 4.8 MMOL/L (ref 3.5–5.1)
NON-UH HIE NA: 138 MMOL/L (ref 135–145)
NON-UH HIE TOTAL CHOL/HDL CHOL RATIO: 2.6
NON-UH HIE TOTAL PROTEIN: 6.9 G/DL (ref 5.7–8.2)
NON-UH HIE TRIGLYCERIDES: 105 MG/DL (ref 30–150)
NON-UH HIE VIT D 25: 68 NG/ML

## 2025-06-24 DIAGNOSIS — F41.9 ANXIETY: ICD-10-CM

## 2025-06-24 DIAGNOSIS — F33.0 MILD EPISODE OF RECURRENT MAJOR DEPRESSIVE DISORDER: ICD-10-CM

## 2025-06-24 DIAGNOSIS — M79.7 FIBROMYALGIA: ICD-10-CM

## 2025-06-24 DIAGNOSIS — M47.816 OSTEOARTHRITIS OF LUMBAR SPINE, UNSPECIFIED SPINAL OSTEOARTHRITIS COMPLICATION STATUS: ICD-10-CM

## 2025-06-24 RX ORDER — CYCLOBENZAPRINE HCL 10 MG
10 TABLET ORAL NIGHTLY
Qty: 30 TABLET | Refills: 0 | Status: SHIPPED | OUTPATIENT
Start: 2025-06-24

## 2025-06-25 RX ORDER — DULOXETIN HYDROCHLORIDE 60 MG/1
60 CAPSULE, DELAYED RELEASE ORAL 2 TIMES DAILY
Qty: 90 CAPSULE | Refills: 2 | Status: SHIPPED | OUTPATIENT
Start: 2025-06-25 | End: 2025-06-27 | Stop reason: SDUPTHER

## 2025-06-27 DIAGNOSIS — F41.9 ANXIETY: ICD-10-CM

## 2025-06-27 DIAGNOSIS — F33.0 MILD EPISODE OF RECURRENT MAJOR DEPRESSIVE DISORDER: ICD-10-CM

## 2025-06-28 RX ORDER — DULOXETIN HYDROCHLORIDE 60 MG/1
60 CAPSULE, DELAYED RELEASE ORAL 2 TIMES DAILY
Qty: 180 CAPSULE | Refills: 2 | Status: SHIPPED | OUTPATIENT
Start: 2025-06-28

## 2025-06-30 DIAGNOSIS — F33.0 MILD EPISODE OF RECURRENT MAJOR DEPRESSIVE DISORDER: ICD-10-CM

## 2025-06-30 DIAGNOSIS — F41.9 ANXIETY: ICD-10-CM

## 2025-07-01 RX ORDER — DULOXETIN HYDROCHLORIDE 60 MG/1
60 CAPSULE, DELAYED RELEASE ORAL 2 TIMES DAILY
Qty: 180 CAPSULE | Refills: 2 | Status: SHIPPED | OUTPATIENT
Start: 2025-07-01

## 2025-07-17 DIAGNOSIS — M79.7 FIBROMYALGIA: ICD-10-CM

## 2025-07-17 DIAGNOSIS — M47.816 OSTEOARTHRITIS OF LUMBAR SPINE, UNSPECIFIED SPINAL OSTEOARTHRITIS COMPLICATION STATUS: ICD-10-CM

## 2025-07-17 RX ORDER — CYCLOBENZAPRINE HCL 10 MG
10 TABLET ORAL NIGHTLY
Qty: 30 TABLET | Refills: 0 | Status: SHIPPED | OUTPATIENT
Start: 2025-07-17

## 2025-08-22 ENCOUNTER — PATIENT MESSAGE (OUTPATIENT)
Dept: RHEUMATOLOGY | Facility: CLINIC | Age: 55
End: 2025-08-22
Payer: COMMERCIAL

## 2025-08-22 DIAGNOSIS — M47.816 OSTEOARTHRITIS OF LUMBAR SPINE, UNSPECIFIED SPINAL OSTEOARTHRITIS COMPLICATION STATUS: ICD-10-CM

## 2025-08-22 DIAGNOSIS — M79.7 FIBROMYALGIA: ICD-10-CM

## 2025-08-25 RX ORDER — CHLORZOXAZONE 500 MG/1
500 TABLET ORAL 2 TIMES DAILY PRN
Qty: 10 TABLET | Refills: 0 | Status: SHIPPED | OUTPATIENT
Start: 2025-08-25 | End: 2025-08-30

## 2025-08-29 DIAGNOSIS — M47.816 OSTEOARTHRITIS OF LUMBAR SPINE, UNSPECIFIED SPINAL OSTEOARTHRITIS COMPLICATION STATUS: ICD-10-CM

## 2025-08-29 DIAGNOSIS — M79.7 FIBROMYALGIA: ICD-10-CM

## 2025-08-29 RX ORDER — CYCLOBENZAPRINE HCL 10 MG
10 TABLET ORAL NIGHTLY
Qty: 30 TABLET | Refills: 0 | Status: SHIPPED | OUTPATIENT
Start: 2025-08-29

## 2025-09-17 ENCOUNTER — APPOINTMENT (OUTPATIENT)
Dept: PRIMARY CARE | Facility: CLINIC | Age: 55
End: 2025-09-17
Payer: COMMERCIAL

## 2025-11-26 ENCOUNTER — APPOINTMENT (OUTPATIENT)
Dept: RHEUMATOLOGY | Facility: CLINIC | Age: 55
End: 2025-11-26
Payer: COMMERCIAL

## 2026-01-22 ENCOUNTER — APPOINTMENT (OUTPATIENT)
Dept: PRIMARY CARE | Facility: CLINIC | Age: 56
End: 2026-01-22
Payer: COMMERCIAL